# Patient Record
(demographics unavailable — no encounter records)

---

## 2024-12-28 NOTE — ED.PDOC
GI ASSESSMENT


HPI Comments


23 year old female presents to the ED with chief complaint of N/V during 

pregnancy. Patient reports that she has been experiencing diffuse abdominal pain

with associated nausea and vomiting for the past 2 weeks. Patient relays that 

her LMP was the week before Thanksgiving and she is A4, believing she is 6 

weeks pregnant. Patient states that she has felt sick all other times she was 

pregnant where she needed to come to the ED. Patient denies any diarrhea, fever,

chills, chest pain, dizziness, or headache.


Time Seen by MD:  15:53


Primary Care Provider:  NONE


Reviewed Notes:  Nurses Notes, Medications, Allergies


Allergies:  


Coded Allergies:  


     NO KNOWN ALLERGIES (Unverified , 14)


Home Meds


Active Scripts


Ondansetron (Zofran) 4 Mg Tab, 4 MG PO TIDP PRN for 7 Days, #20 MG


   Prov:MICHELLE SILVERIO S DO         23


Cephalexin ( Keflex 500) 500 Mg Cap, 1 CAP PO TID for 7 Days, #30 CAP


   Prov:MICHELLE SILVERIO DO         23


Reported Medications


Nitrofurantoin Monohydrate Mac (Macrobid) 100 Mg Cap, 100 MG PO BID for 7 Days, 

CAP


   23


Prenatal Vit W/ Ferrous Fumara (Prenatal One Daily) Daily Tab, 1 TAB PO DAILY, 

#30 TAB 11 Refills


   23


Information Source:  Patient


Mode of Arrival:  Ambulatory


Timing:  Days


Duration:  Since onset


Prehospital treatment:  None


Quality:  None


Vomitus:  Watery


Stool:  Normal


Severity:  Moderate


Recent:  None


Recent Hx of:  None


Pain Location:  Diffuse


Modifying Factors:  Nothing


Associated sign and symptoms:  Nausea, Vomiting, Abdominal Pain





Past Medical History


PAST MEDICAL HISTORY:  Denies


Surgical History:  Denies all surgeries


GYN History:  No Pertinent GYN History





6


Para


1


AB


4


LMP








Family History


Family History:  Unknown





Social History


Smoker:  Non-Smoker


Alcohol:  Denies ETOH Use


Drugs:  Denies Drug Use


Lives In:  Home





Constitutional:  denies: chills, diaphoresis, fatigue, fever, malaise, sweats, 

weakness, others


EENTM:  denies: blurred vision, double vision, ear bleeding, ear discharge, ear 

drainage, ear pain, ear ringing, eye pain, eye redness, hearing loss, mouth 

pain, mouth swelling, nasal discharge, nose bleeding, nose congestion, nose 

pain, photophobia, tearing, throat pain, throat swelling, voice changes, others


Respiratory:  denies: cough, hemoptysis, orthopnea, SOB at rest, shortness of 

breath, SOB with excertion, stridor, wheezing, others


Cardiovascular:  denies: chest pain, dizzy spells, diaphoresis, Dyspnea on 

exertion, edema, irregular heart beat, left arm pain, lightheadedness, 

palpitations, PND, syncope, others


Gastrointestinal:  reports: abdominal pain, nausea, vomiting; denies: abdomen 

distended, blood streaked bowels, constipated, diarrhea, dysphagia, difficulty 

swallowing, hematemesis, melena, poor appetite, poor fluid intake, rectal 

bleeding, rectal pain, others


Genitourinary:  reports: pregnant; denies: abnormal vagina bleeding, burning, 

dyspareunia, dysuria, flank pain, frequency, hematuria, incontinence, pain, 

vagina discharge, urgency, others


Neurological:  denies: dizziness, fainting, headache, left sided numbness, left 

sided weakness, numbness, paresthesia, pre-existing deficit, right sided 

numbness, right sided weakness, seizure, speech problems, tingling, tremors, 

weakness, others


Musculoskeletal:  denies: back pain, gout, joint pain, joint swelling, muscle 

pain, muscle stiffness, neck pain, others


Integumetry:  denies: bruises, change in color, change in hair/nails, dryness, 

laceration, lesions, lumps, rash, wounds, others


Allergic/Immunocompromised:  denies: Difficulty Healing, Frequent Infections, 

Hives, Itching, others


Hematologic/Lymphatic:  denies: anemia, blood clots, easy bleeding, easy 

bruising, swollen glands, others


Endocrine:  denies: excessive hunger, excessive sweating, excessive thirst, 

excessive urination, flushing, intolerance to cold, intolerance to heat, 

unexplained weight gain, unexplained weight loss, others


Psychiatric:  denies: anxiety, bipolar disorder, depression, hopeless, panic 

disorder, schizophrenia, sleepless, suicidal, others


All Other Systems:  Reviewed and Negative





Physical Exam


General Appearance:  No Apparent Distress, Normal


HEENT:  Normal ENT Inspection, PERRL/EOMI


Neck:  Full Range of Motion, Non-Tender, Normal, Normal Inspection


Respiratory:  Chest Non-Tender, Lungs Clear, No Accessory Muscle Use, No 

Respiratory Distress, Normal Breath Sounds


Cardiovascular:  No Edema, No JVD, No Murmur, No Gallop, Normal Peripheral 

Pulses, Regular Rate/Rhythm


Breast Exam:  Deferred


Gastrointestinal:  No Organomegaly, Non Tender, No Pulsatile Mass, Normal Bowel 

Sounds, Soft


Genitalia:  Deferred


Pelvic:  Deferred


Rectal:  Deferred


Extremities:  No calf tenderness, Normal capillary refill, Normal inspection, 

Normal range of motion, Non-tender, No pedal edema


Musculoskeletal :  


   Apperance:  Normal


Neurologic:  Alert, CNs II-XII nml as Tested, No Motor Deficits, Normal Affect, 

Normal Mood, No Sensory Deficits


Cerebellar Function:  Normal


Reflexes:  Normal


Skin:  Dry, Normal Color, Warm


Lymphatic:  No Adenopathy





Was a procedure done?


Was a procedure done?:  No





GI differential Dx


Differential Diagnosis:  Appendicitis, Complete , Incomplete , 

Inevitable , Missed , Threatened , Abruptio placentae, 

Constipation, Ectopic pregnancy, Gastritis/PUD, Gastroenteritis, UTI, 

Dehydration, Diabetes/ DKA, Electrolyte Imbalance, Food Poisoning, Pregnancy, 

Bacterial, Parasitic, Viral, Hypovolemia, Impaction, Other (HYPEREMESIS 

GRAVIDARUM)





X-Ray, Labs, Meds, VS





                                   Vital Signs








  Date Time  Temp Pulse Resp B/P (MAP) Pulse Ox O2 Delivery O2 Flow Rate FiO2


 


24 16:01 97.0 89 20 117/79 (92) 100   








Time of 1ST Reevaluation:  16:53


Reevaluation 1ST:  Unchanged


Time of 2ND Reevaluation:  18:00


Reevaluation 2ND:  ELOPED


Patient Education/Counseling:  Diagnosis, Treatment


Family Education/Counseling:  No Family Present


Additional Information


- I reviewed the following notes from patient's past medical encounters: 10/9/23

for Migraines





- The following tests were ordered, and results were reviewed by me: 





- I reviewed and agreed with the following test results read by other provider: 

(X-ray, CT, US)- PT ELOPED BEFORE ANY TESTS ARE DONE





- I discussed treatments and results with medical personnel.





Departure 1


Departure


Time of Disposition:  19:02


Impression:  


   Primary Impression:  


   Nausea & vomiting


   Additional Impression:  


   Pregnancy


Disposition:   LEFT AWOL/ELOPED


Condition:  Other (UNKNOWN)





Critical Care Note


Critical Care Time?:  No





Stability


Stability form required:  No





Heart Score


Heart Score:  








Heart Score Response (Comments) Value


 


History N/A 0


 


EKG N/A 0


 


Age N/A 0


 


Risk Factors N/A 0


 


Troponin N/A 0


 


Total  0














I personally scribed for SHAYLA GOYAL MD ([email or web address]) on 24 at 16:08.  

Electronically submitted by Gary Webster ([email or web address]).





SHAYLA GOYAL MD                Dec 28, 2024 16:08